# Patient Record
Sex: FEMALE | Race: WHITE | NOT HISPANIC OR LATINO | Employment: OTHER | URBAN - METROPOLITAN AREA
[De-identification: names, ages, dates, MRNs, and addresses within clinical notes are randomized per-mention and may not be internally consistent; named-entity substitution may affect disease eponyms.]

---

## 2020-02-24 ENCOUNTER — OFFICE VISIT (OUTPATIENT)
Dept: OBGYN CLINIC | Facility: CLINIC | Age: 30
End: 2020-02-24
Payer: COMMERCIAL

## 2020-02-24 ENCOUNTER — APPOINTMENT (OUTPATIENT)
Dept: LAB | Facility: CLINIC | Age: 30
End: 2020-02-24
Payer: COMMERCIAL

## 2020-02-24 VITALS
WEIGHT: 187.4 LBS | SYSTOLIC BLOOD PRESSURE: 108 MMHG | HEIGHT: 62 IN | DIASTOLIC BLOOD PRESSURE: 58 MMHG | BODY MASS INDEX: 34.48 KG/M2

## 2020-02-24 DIAGNOSIS — B96.89 BACTERIAL VAGINOSIS: ICD-10-CM

## 2020-02-24 DIAGNOSIS — E28.2 PCOS (POLYCYSTIC OVARIAN SYNDROME): ICD-10-CM

## 2020-02-24 DIAGNOSIS — N76.0 BACTERIAL VAGINOSIS: ICD-10-CM

## 2020-02-24 DIAGNOSIS — E28.2 PCOS (POLYCYSTIC OVARIAN SYNDROME): Primary | ICD-10-CM

## 2020-02-24 LAB
B-HCG SERPL-ACNC: <2 MIU/ML
BV WHIFF TEST VAG QL: NEGATIVE
CLUE CELLS SPEC QL WET PREP: POSITIVE
ESTRADIOL SERPL-MCNC: 89 PG/ML
FSH SERPL-ACNC: 3.9 MIU/ML
PH SMN: 5 [PH]
PROLACTIN SERPL-MCNC: 6 NG/ML
T VAGINALIS VAG QL WET PREP: NEGATIVE
YEAST VAG QL WET PREP: NEGATIVE

## 2020-02-24 PROCEDURE — 84403 ASSAY OF TOTAL TESTOSTERONE: CPT

## 2020-02-24 PROCEDURE — 84402 ASSAY OF FREE TESTOSTERONE: CPT

## 2020-02-24 PROCEDURE — 36415 COLL VENOUS BLD VENIPUNCTURE: CPT

## 2020-02-24 PROCEDURE — 99214 OFFICE O/P EST MOD 30 MIN: CPT | Performed by: OBSTETRICS & GYNECOLOGY

## 2020-02-24 PROCEDURE — 82627 DEHYDROEPIANDROSTERONE: CPT

## 2020-02-24 PROCEDURE — 84702 CHORIONIC GONADOTROPIN TEST: CPT

## 2020-02-24 PROCEDURE — 87210 SMEAR WET MOUNT SALINE/INK: CPT | Performed by: OBSTETRICS & GYNECOLOGY

## 2020-02-24 PROCEDURE — 83001 ASSAY OF GONADOTROPIN (FSH): CPT

## 2020-02-24 PROCEDURE — 84146 ASSAY OF PROLACTIN: CPT

## 2020-02-24 PROCEDURE — 82670 ASSAY OF TOTAL ESTRADIOL: CPT

## 2020-02-24 RX ORDER — METFORMIN HYDROCHLORIDE 500 MG/1
TABLET, EXTENDED RELEASE ORAL
COMMUNITY
Start: 2020-01-31 | End: 2021-03-29

## 2020-02-24 RX ORDER — FLUCONAZOLE 150 MG/1
150 TABLET ORAL
Qty: 3 TABLET | Refills: 0 | Status: SHIPPED | OUTPATIENT
Start: 2020-02-24 | End: 2020-03-02

## 2020-02-24 RX ORDER — LEVALBUTEROL TARTRATE 45 UG/1
2 AEROSOL, METERED ORAL EVERY 6 HOURS PRN
COMMUNITY
Start: 2020-01-17 | End: 2021-01-16

## 2020-02-24 RX ORDER — MELATONIN
2000 DAILY
COMMUNITY

## 2020-02-24 RX ORDER — METRONIDAZOLE 500 MG/1
500 TABLET ORAL EVERY 12 HOURS SCHEDULED
Qty: 14 TABLET | Refills: 0 | Status: SHIPPED | OUTPATIENT
Start: 2020-02-24 | End: 2020-03-02

## 2020-02-24 RX ORDER — CETIRIZINE HYDROCHLORIDE 1 MG/ML
10 SOLUTION ORAL
COMMUNITY

## 2020-02-24 NOTE — PATIENT INSTRUCTIONS
Bacterial Vaginosis   WHAT YOU NEED TO KNOW:   Bacterial vaginosis (BV) is an infection in the vagina  It may cause vaginitis, which is irritation and inflammation of the vagina  DISCHARGE INSTRUCTIONS:   Medicines:   · Antibiotics: These are given to kill the bacteria that cause BV  They may be given as a pill or a cream to put in your vagina  Take or use as directed  · Take your medicine as directed  Contact your healthcare provider if you think your medicine is not helping or if you have side effects  Tell him of her if you are allergic to any medicine  Keep a list of the medicines, vitamins, and herbs you take  Include the amounts, and when and why you take them  Bring the list or the pill bottles to follow-up visits  Carry your medicine list with you in case of an emergency  Prevent bacterial vaginosis:   · Keep your vaginal area clean and dry:  Wear underwear and pantyhose with a cotton crotch  Wipe from front to back after you urinate or have a bowel movement  After bathing, rinse soap from your vaginal area to decrease your risk for irritation  · Do not use products that cause irritation:  Always use unscented tampons or sanitary pads  Do not use feminine sprays, powders, or scented tampons because they may cause irritation and increase your risk of BV  Detergents and fabric softeners may also cause irritation  · Do not douche: This can cause an imbalance in healthy vaginal bacteria  · Use latex condoms: This helps prevent another infection and keeps your partner from getting the infection  Contact your healthcare provider if:   · Your symptoms come back or do not improve with treatment  · You have vaginal bleeding that is not your monthly period  · You have questions or concerns about your condition or care  © 2017 [employer] Information is for End User's use only and may not be sold, redistributed or otherwise used for commercial purposes   All illustrations and images included in CareNotes® are the copyrighted property of A D A M , Inc  or [employer]am Brown  The above information is an  only  It is not intended as medical advice for individual conditions or treatments  Talk to your doctor, nurse or pharmacist before following any medical regimen to see if it is safe and effective for you

## 2020-02-24 NOTE — PROGRESS NOTES
Assessment/Plan:     Diagnoses and all orders for this visit:    PCOS (polycystic ovarian syndrome)  -     hCG, quantitative; Future  -     Follicle stimulating hormone; Future  -     DHEA-sulfate; Future  -     Prolactin; Future  -     Testosterone, free, total; Future  -     Estradiol; Future    Bacterial vaginosis  -     fluconazole (DIFLUCAN) 150 mg tablet; Take 1 tablet (150 mg total) by mouth every 3 (three) days for 3 doses  -     metroNIDAZOLE (FLAGYL) 500 mg tablet; Take 1 tablet (500 mg total) by mouth every 12 (twelve) hours for 7 days  -     POCT wet mount    Other orders  -     cetirizine (ZyrTEC) oral solution; Take 10 mg by mouth  -     levalbuterol (XOPENEX HFA) 45 mcg/act inhaler; Inhale 2 puffs every 6 (six) hours as needed  -     metFORMIN (GLUCOPHAGE-XR) 500 mg 24 hr tablet; Start one tab after dinner  Add tab after breakfast  Add 2nd tab after dinner, 2nd tab after breakfast  Goal is two 500 mg tablets 2x/day  -     cholecalciferol (VITAMIN D3) 1,000 units tablet; Take 2,000 Units by mouth daily            Bacterial Vaginosis    Bacterial vaginosis diagnosed on exam today based on symptoms and clinical findings  Amsel criteria (three out of four) was used for diagnosis, vaginal pH >4 5, positive whiff test, clue cells on saline microscopy (>20% of epithelial cells) and whiff test   Discussed with patient diagnosis in detail including risk factors, signs and symptoms  Treatment was indicated for relief of symptoms and was prescribed  Discussed with patient vulvar hygiene and avoidance of intercourse during treatment  Antibiotics were prescribed and patient advised to complete course of antibiotics  She was asked to call if with untoward side effects and if no relief of symptoms after treatment  Follow-up is unnecessary if symptoms resolve  Discussed with patient that treatment of sexual partners is unnecessary    Also discussed that 30% of patients may experience a recurrence in symptoms within 3  months  Patient states that she frequently gets a yeast infection after being treated with bacterial vaginosis and so she was given a prescription for fluconazole  With regards to abnormal vaginal bleeding, this may be related to her polycystic ovarian syndrome  She declines hormonal therapy unless indicated  She would like to have her hormone levels checked including FSH due to her hot flashes  I also discussed that most likely her pain with intercourse was triggered by her bacterial vaginosis symptoms  She was asked to follow up in 1 week to go over test results and to discuss further treatment and evaluation recommendations  I did recommend that she start metformin to improve insulin resistance and also afford weight loss as well as improvement in ovulatory function  Subjective   Patient ID: Diane Juan is a 34 y o  female  Chief Complaint   Patient presents with    Dyspareunia    Polycystic Ovary Syndrome    Vaginal Discharge     pinkish color    Hot Flashes    Vaginal Itching     patient has h/o BV          HPI:  Patient is a 59-year-old  6 para 3024  Patient with a history of 3 prior C-sections, 1 spontaneous vaginal delivery,  2 SAB and a tubal ligation  Patient with history of polycystic ovarian syndrome  She was initially supposed to start metformin 500 mg b i d  more recently with suggested by weight management  She is currently not using hormonal contraception   She is sexually active with her   She has been  for the past 4 years and denied any STDs  She delivered in 2018 at [place] [address]    She complained of abnormal uterine bleeding with prolonged spotting the beginning of January  She had also complained with pain with intercourse  She does present with itching and vaginal odor  She does report hot flashes and decreased sex drive    She was seen at [facility] and a pelvic ultrasound was ordered that was normal   TSH was ordered and was also normal   She was told she had sexual dysfunction and is here for 2nd opinion      Menstrual History:  OB History        6    Para   4    Term   4       0    AB   2    Living   4       SAB   0    TAB        Ectopic        Multiple        Live Births   4                   Patient's last menstrual period was 2019 (exact date)           Past Medical History:   Diagnosis Date    PCOS (polycystic ovarian syndrome)     PVC (premature ventricular contraction)        Past Surgical History:   Procedure Laterality Date     SECTION  2013     SECTION  2017     SECTION  2018    CHOLECYSTECTOMY  2014       Social History     Socioeconomic History    Marital status: /Civil Union     Spouse name: Not on file    Number of children: Not on file    Years of education: Not on file    Highest education level: Not on file   Occupational History    Not on file   Social Needs    Financial resource strain: Not on file    Food insecurity:     Worry: Not on file     Inability: Not on file    Transportation needs:     Medical: Not on file     Non-medical: Not on file   Tobacco Use    Smoking status: Never Smoker    Smokeless tobacco: Never Used   Substance and Sexual Activity    Alcohol use: Not Currently     Frequency: Never    Drug use: Never    Sexual activity: Yes     Partners: Male   Lifestyle    Physical activity:     Days per week: Not on file     Minutes per session: Not on file    Stress: Not on file   Relationships    Social connections:     Talks on phone: Not on file     Gets together: Not on file     Attends Taoist service: Not on file     Active member of club or organization: Not on file     Attends meetings of clubs or organizations: Not on file     Relationship status: Not on file    Intimate partner violence:     Fear of current or ex partner: Not on file     Emotionally abused: Not on file Physically abused: Not on file     Forced sexual activity: Not on file   Other Topics Concern    Not on file   Social History Narrative    Not on file       Allergies   Allergen Reactions    Lac Bovis     Latex Rash    Penicillins Rash         Current Outpatient Medications:     cetirizine (ZyrTEC) oral solution, Take 10 mg by mouth, Disp: , Rfl:     cholecalciferol (VITAMIN D3) 1,000 units tablet, Take 2,000 Units by mouth daily, Disp: , Rfl:     levalbuterol (XOPENEX HFA) 45 mcg/act inhaler, Inhale 2 puffs every 6 (six) hours as needed, Disp: , Rfl:     metFORMIN (GLUCOPHAGE-XR) 500 mg 24 hr tablet, Start one tab after dinner  Add tab after breakfast  Add 2nd tab after dinner, 2nd tab after breakfast  Goal is two 500 mg tablets 2x/day , Disp: , Rfl:     fluconazole (DIFLUCAN) 150 mg tablet, Take 1 tablet (150 mg total) by mouth every 3 (three) days for 3 doses, Disp: 3 tablet, Rfl: 0    metroNIDAZOLE (FLAGYL) 500 mg tablet, Take 1 tablet (500 mg total) by mouth every 12 (twelve) hours for 7 days, Disp: 14 tablet, Rfl: 0    Review of Systems   Constitutional: Negative  HENT: Negative  Eyes: Negative  Respiratory: Negative  Cardiovascular: Negative  Gastrointestinal: Negative  Endocrine: Negative  Genitourinary:        As noted in HPI   Musculoskeletal: Negative  Skin: Negative  Allergic/Immunologic: Negative  Neurological: Negative  Hematological: Negative  Psychiatric/Behavioral: Negative  /58 (BP Location: Right arm, Patient Position: Sitting, Cuff Size: Standard)   Ht 5' 2" (1 575 m)   Wt 85 kg (187 lb 6 4 oz)   LMP 12/31/2019 (Exact Date)   BMI 34 28 kg/m²     Physical Exam   Constitutional: She is oriented to person, place, and time  She appears well-developed and well-nourished  No distress  Genitourinary: Uterus normal  Pelvic exam was performed with patient supine  There is no rash, tenderness, lesion or injury on the right labia   There is no rash, tenderness, lesion or injury on the left labia  No erythema, tenderness or bleeding in the vagina  No signs of injury around the vagina  Vaginal discharge found  Right adnexum does not display mass, does not display tenderness and does not display fullness  Left adnexum does not display mass, does not display tenderness and does not display fullness  Cervix does not exhibit motion tenderness, lesion, discharge or polyp  Uterus is not enlarged or tender  Abdominal: Soft  She exhibits no distension  There is no tenderness  Neurological: She is alert and oriented to person, place, and time  Skin: Skin is warm and dry  Psychiatric: She has a normal mood and affect  Her behavior is normal            The following portions of the patient's history were reviewed and updated as appropriate: allergies, current medications, past family history, past medical history, past social history, past surgical history and problem list       Counseling / Coordination of Care  Total time spent today30 minutes  minutes  Greater than 50% of total time was spent with the patient and / or family counselin             [facility] [address]                                [address]                            [facility] [address]                          Voice:  [phone]                           Fax:  [phone]      2020      RE: Saturnino Andrade                 Physician: SYLWIA Lizama   MR#: [id number]                     [facility]  : [facility] [address]    (Exam #: [id number])              [place], [place] [address]                                     Fax: [phone]    The LMP of this 34year old patient was unknown  A sonographic  examination was performed on 2020 using real time equipment  The ultrasound examination was performed using vaginal technique      The indication for this exam was pelvic pain  INDICATIONS    pelvic pain    CERVICAL EVALUATION    The cervix appeared normal     UTERUS    The uterus was visualized, anteverted in orientation with a  symmetrical shape  The size appeared normal, measuring 9 20 x 6 06 x  4 51 cm  The myometrium appeared homogeneous  The endometrium was  symmetrical in shape with a single layer thickness of 0 93 cm  The  endometrium lining appeared regular  CUL DE SAC FLUID    No free fluid was identified in the cul de sac  ADNEXA    The left ovary appeared normal and measured 2 1 x 2 1 x 2 9 cm with a  volume of 6 6 cc  The right ovary appeared normal and measured 2 2 x  2 0 x 2 8 cm with a volume of 6 4 cc  Neither of the tubes was  visualized  GENERAL COMMENT    Bilateral ovarian blood flow seen  Please see attached note for interpretation  DADA Ruiz , R HERIBERTO M S  Electronically Signed 02/18/20 10:46g and / or coordination of care       TSH normal

## 2020-02-25 LAB
DHEA-S SERPL-MCNC: 258.7 UG/DL (ref 84.8–378)
TESTOST FREE SERPL-MCNC: 2.8 PG/ML (ref 0–4.2)
TESTOST SERPL-MCNC: 42 NG/DL (ref 8–48)

## 2020-04-06 ENCOUNTER — TELEMEDICINE (OUTPATIENT)
Dept: OBGYN CLINIC | Facility: CLINIC | Age: 30
End: 2020-04-06
Payer: COMMERCIAL

## 2020-04-06 VITALS — SYSTOLIC BLOOD PRESSURE: 128 MMHG | WEIGHT: 181.4 LBS | BODY MASS INDEX: 33.18 KG/M2 | DIASTOLIC BLOOD PRESSURE: 80 MMHG

## 2020-04-06 DIAGNOSIS — R39.9 UTI SYMPTOMS: Primary | ICD-10-CM

## 2020-04-06 DIAGNOSIS — R30.0 DYSURIA: ICD-10-CM

## 2020-04-06 DIAGNOSIS — R35.0 FREQUENCY OF URINATION: ICD-10-CM

## 2020-04-06 PROCEDURE — 99213 OFFICE O/P EST LOW 20 MIN: CPT | Performed by: OBSTETRICS & GYNECOLOGY

## 2020-04-06 RX ORDER — NITROFURANTOIN 25; 75 MG/1; MG/1
100 CAPSULE ORAL 2 TIMES DAILY
Qty: 14 CAPSULE | Refills: 0 | Status: SHIPPED | OUTPATIENT
Start: 2020-04-06

## 2020-04-06 RX ORDER — OMEGA-3/DHA/EPA/FISH OIL 60 MG-90MG
CAPSULE ORAL
COMMUNITY
End: 2021-03-29

## 2020-04-06 RX ORDER — FLUOXETINE 10 MG/1
10 CAPSULE ORAL DAILY
COMMUNITY
Start: 2020-03-02 | End: 2021-03-29

## 2020-04-06 RX ORDER — PHENAZOPYRIDINE HYDROCHLORIDE 200 MG/1
200 TABLET, FILM COATED ORAL
Qty: 10 TABLET | Refills: 0 | Status: SHIPPED | OUTPATIENT
Start: 2020-04-06 | End: 2020-04-11

## 2021-03-29 RX ORDER — ACETAMINOPHEN 500 MG
500 TABLET ORAL EVERY 6 HOURS PRN
COMMUNITY

## 2021-03-29 RX ORDER — ALBUTEROL SULFATE 90 UG/1
2 AEROSOL, METERED RESPIRATORY (INHALATION) EVERY 6 HOURS PRN
COMMUNITY

## 2021-03-29 RX ORDER — MULTIVITAMIN
1 TABLET ORAL DAILY
COMMUNITY

## 2021-03-29 NOTE — PRE-PROCEDURE INSTRUCTIONS
Pre-Surgery Instructions:   Medication Instructions    acetaminophen (TYLENOL) 500 mg tablet Instructed patient per Anesthesia Guidelines   albuterol (PROVENTIL HFA,VENTOLIN HFA) 90 mcg/act inhaler Instructed patient per Anesthesia Guidelines   cetirizine (ZyrTEC) oral solution Instructed patient per Anesthesia Guidelines   cholecalciferol (VITAMIN D3) 1,000 units tablet Instructed patient per Anesthesia Guidelines   Multiple Vitamin (multivitamin) tablet Instructed patient per Anesthesia Guidelines  Patient instructed if needed tylenolwith a sip of water the morning of surgery and if needed albuterol IN  Patient  instructed on use of chlorhexidine soap per hospital protocol    Patient instructed to stop all ASA, NSAIDS, vitamins and herbal supplements one week prior to surgery or per Dr Mehreen Flores

## 2021-03-30 ENCOUNTER — ANESTHESIA EVENT (OUTPATIENT)
Dept: PERIOP | Facility: HOSPITAL | Age: 31
End: 2021-03-30
Payer: SELF-PAY

## 2021-03-31 ENCOUNTER — HOSPITAL ENCOUNTER (OUTPATIENT)
Facility: HOSPITAL | Age: 31
Setting detail: OUTPATIENT SURGERY
Discharge: HOME | End: 2021-03-31
Attending: SURGERY | Admitting: SURGERY
Payer: SELF-PAY

## 2021-03-31 ENCOUNTER — ANESTHESIA (OUTPATIENT)
Dept: PERIOP | Facility: HOSPITAL | Age: 31
End: 2021-03-31
Payer: SELF-PAY

## 2021-03-31 VITALS
HEIGHT: 62 IN | HEART RATE: 70 BPM | RESPIRATION RATE: 16 BRPM | DIASTOLIC BLOOD PRESSURE: 51 MMHG | BODY MASS INDEX: 29.44 KG/M2 | SYSTOLIC BLOOD PRESSURE: 107 MMHG | WEIGHT: 160 LBS | TEMPERATURE: 97.5 F | OXYGEN SATURATION: 98 %

## 2021-03-31 PROBLEM — F41.9 ANXIETY: Status: ACTIVE | Noted: 2021-03-31

## 2021-03-31 PROBLEM — E88.1 LIPODYSTROPHY: Status: ACTIVE | Noted: 2021-03-31

## 2021-03-31 LAB
EXT PREGNANCY TEST URINE: NEGATIVE
EXT. CONTROL: NORMAL

## 2021-03-31 PROCEDURE — 81025 URINE PREGNANCY TEST: CPT | Performed by: ANESTHESIOLOGY

## 2021-03-31 RX ORDER — DEXAMETHASONE SODIUM PHOSPHATE 10 MG/ML
INJECTION, SOLUTION INTRAMUSCULAR; INTRAVENOUS AS NEEDED
Status: DISCONTINUED | OUTPATIENT
Start: 2021-03-31 | End: 2021-03-31

## 2021-03-31 RX ORDER — KETOROLAC TROMETHAMINE 30 MG/ML
30 INJECTION, SOLUTION INTRAMUSCULAR; INTRAVENOUS ONCE
Status: COMPLETED | OUTPATIENT
Start: 2021-03-31 | End: 2021-03-31

## 2021-03-31 RX ORDER — MINERAL OIL
OIL (ML) MISCELLANEOUS AS NEEDED
Status: DISCONTINUED | OUTPATIENT
Start: 2021-03-31 | End: 2021-03-31 | Stop reason: HOSPADM

## 2021-03-31 RX ORDER — ROCURONIUM BROMIDE 10 MG/ML
INJECTION, SOLUTION INTRAVENOUS AS NEEDED
Status: DISCONTINUED | OUTPATIENT
Start: 2021-03-31 | End: 2021-03-31

## 2021-03-31 RX ORDER — CEFAZOLIN SODIUM 1 G/50ML
1000 SOLUTION INTRAVENOUS ONCE
Status: COMPLETED | OUTPATIENT
Start: 2021-03-31 | End: 2021-03-31

## 2021-03-31 RX ORDER — SODIUM CHLORIDE, SODIUM LACTATE, POTASSIUM CHLORIDE, CALCIUM CHLORIDE 600; 310; 30; 20 MG/100ML; MG/100ML; MG/100ML; MG/100ML
INJECTION, SOLUTION INTRAVENOUS CONTINUOUS PRN
Status: DISCONTINUED | OUTPATIENT
Start: 2021-03-31 | End: 2021-03-31

## 2021-03-31 RX ORDER — HYDROCODONE BITARTRATE AND ACETAMINOPHEN 5; 325 MG/1; MG/1
1 TABLET ORAL EVERY 4 HOURS PRN
Status: DISCONTINUED | OUTPATIENT
Start: 2021-03-31 | End: 2021-03-31 | Stop reason: HOSPADM

## 2021-03-31 RX ORDER — FENTANYL CITRATE/PF 50 MCG/ML
25 SYRINGE (ML) INJECTION
Status: DISCONTINUED | OUTPATIENT
Start: 2021-03-31 | End: 2021-03-31 | Stop reason: HOSPADM

## 2021-03-31 RX ORDER — LIDOCAINE HYDROCHLORIDE 10 MG/ML
INJECTION, SOLUTION EPIDURAL; INFILTRATION; INTRACAUDAL; PERINEURAL AS NEEDED
Status: DISCONTINUED | OUTPATIENT
Start: 2021-03-31 | End: 2021-03-31

## 2021-03-31 RX ORDER — HYDROMORPHONE HCL/PF 1 MG/ML
0.5 SYRINGE (ML) INJECTION
Status: DISCONTINUED | OUTPATIENT
Start: 2021-03-31 | End: 2021-03-31 | Stop reason: HOSPADM

## 2021-03-31 RX ORDER — EPHEDRINE SULFATE 50 MG/ML
INJECTION INTRAVENOUS AS NEEDED
Status: DISCONTINUED | OUTPATIENT
Start: 2021-03-31 | End: 2021-03-31

## 2021-03-31 RX ORDER — FENTANYL CITRATE 50 UG/ML
INJECTION, SOLUTION INTRAMUSCULAR; INTRAVENOUS AS NEEDED
Status: DISCONTINUED | OUTPATIENT
Start: 2021-03-31 | End: 2021-03-31

## 2021-03-31 RX ORDER — PROPOFOL 10 MG/ML
INJECTION, EMULSION INTRAVENOUS AS NEEDED
Status: DISCONTINUED | OUTPATIENT
Start: 2021-03-31 | End: 2021-03-31

## 2021-03-31 RX ORDER — SODIUM CHLORIDE 9 MG/ML
125 INJECTION, SOLUTION INTRAVENOUS CONTINUOUS
Status: DISCONTINUED | OUTPATIENT
Start: 2021-03-31 | End: 2021-03-31 | Stop reason: HOSPADM

## 2021-03-31 RX ORDER — MIDAZOLAM HYDROCHLORIDE 2 MG/2ML
INJECTION, SOLUTION INTRAMUSCULAR; INTRAVENOUS AS NEEDED
Status: DISCONTINUED | OUTPATIENT
Start: 2021-03-31 | End: 2021-03-31

## 2021-03-31 RX ORDER — BUPIVACAINE HYDROCHLORIDE AND EPINEPHRINE 2.5; 5 MG/ML; UG/ML
INJECTION, SOLUTION EPIDURAL; INFILTRATION; INTRACAUDAL; PERINEURAL AS NEEDED
Status: DISCONTINUED | OUTPATIENT
Start: 2021-03-31 | End: 2021-03-31 | Stop reason: HOSPADM

## 2021-03-31 RX ORDER — HYDROCODONE BITARTRATE AND ACETAMINOPHEN 5; 325 MG/1; MG/1
2 TABLET ORAL EVERY 4 HOURS PRN
Status: DISCONTINUED | OUTPATIENT
Start: 2021-03-31 | End: 2021-03-31 | Stop reason: HOSPADM

## 2021-03-31 RX ORDER — GLYCOPYRROLATE 0.2 MG/ML
INJECTION INTRAMUSCULAR; INTRAVENOUS AS NEEDED
Status: DISCONTINUED | OUTPATIENT
Start: 2021-03-31 | End: 2021-03-31

## 2021-03-31 RX ORDER — NEOSTIGMINE METHYLSULFATE 1 MG/ML
INJECTION INTRAVENOUS AS NEEDED
Status: DISCONTINUED | OUTPATIENT
Start: 2021-03-31 | End: 2021-03-31

## 2021-03-31 RX ORDER — ONDANSETRON 2 MG/ML
INJECTION INTRAMUSCULAR; INTRAVENOUS AS NEEDED
Status: DISCONTINUED | OUTPATIENT
Start: 2021-03-31 | End: 2021-03-31

## 2021-03-31 RX ORDER — ALBUTEROL SULFATE 2.5 MG/3ML
2.5 SOLUTION RESPIRATORY (INHALATION) ONCE AS NEEDED
Status: DISCONTINUED | OUTPATIENT
Start: 2021-03-31 | End: 2021-03-31 | Stop reason: HOSPADM

## 2021-03-31 RX ORDER — DEXMEDETOMIDINE HYDROCHLORIDE 100 UG/ML
INJECTION, SOLUTION INTRAVENOUS AS NEEDED
Status: DISCONTINUED | OUTPATIENT
Start: 2021-03-31 | End: 2021-03-31

## 2021-03-31 RX ADMIN — DEXMEDETOMIDINE HYDROCHLORIDE 12 MCG: 100 INJECTION, SOLUTION INTRAVENOUS at 08:55

## 2021-03-31 RX ADMIN — FENTANYL CITRATE 50 MCG: 50 INJECTION, SOLUTION INTRAMUSCULAR; INTRAVENOUS at 10:06

## 2021-03-31 RX ADMIN — DEXMEDETOMIDINE HYDROCHLORIDE 8 MCG: 100 INJECTION, SOLUTION INTRAVENOUS at 09:05

## 2021-03-31 RX ADMIN — ROCURONIUM BROMIDE 20 MG: 10 INJECTION, SOLUTION INTRAVENOUS at 09:43

## 2021-03-31 RX ADMIN — PROPOFOL 200 MG: 10 INJECTION, EMULSION INTRAVENOUS at 08:47

## 2021-03-31 RX ADMIN — SODIUM CHLORIDE, SODIUM LACTATE, POTASSIUM CHLORIDE, AND CALCIUM CHLORIDE: .6; .31; .03; .02 INJECTION, SOLUTION INTRAVENOUS at 10:06

## 2021-03-31 RX ADMIN — ONDANSETRON 4 MG: 2 INJECTION INTRAMUSCULAR; INTRAVENOUS at 09:08

## 2021-03-31 RX ADMIN — DEXAMETHASONE SODIUM PHOSPHATE 4 MG: 10 INJECTION, SOLUTION INTRAMUSCULAR; INTRAVENOUS at 09:08

## 2021-03-31 RX ADMIN — SODIUM CHLORIDE: 0.9 INJECTION, SOLUTION INTRAVENOUS at 09:20

## 2021-03-31 RX ADMIN — GLYCOPYRROLATE 0.6 MG: 0.2 INJECTION, SOLUTION INTRAMUSCULAR; INTRAVENOUS at 11:59

## 2021-03-31 RX ADMIN — EPHEDRINE SULFATE 5 MG: 50 INJECTION, SOLUTION INTRAVENOUS at 11:33

## 2021-03-31 RX ADMIN — NEOSTIGMINE METHYLSULFATE 4 MG: 1 INJECTION INTRAVENOUS at 11:59

## 2021-03-31 RX ADMIN — SODIUM CHLORIDE, SODIUM LACTATE, POTASSIUM CHLORIDE, AND CALCIUM CHLORIDE: .6; .31; .03; .02 INJECTION, SOLUTION INTRAVENOUS at 11:35

## 2021-03-31 RX ADMIN — CEFAZOLIN SODIUM 1000 MG: 1 SOLUTION INTRAVENOUS at 08:35

## 2021-03-31 RX ADMIN — ONDANSETRON 4 MG: 2 INJECTION INTRAMUSCULAR; INTRAVENOUS at 11:51

## 2021-03-31 RX ADMIN — ROCURONIUM BROMIDE 50 MG: 10 INJECTION, SOLUTION INTRAVENOUS at 08:47

## 2021-03-31 RX ADMIN — MIDAZOLAM 2 MG: 1 INJECTION INTRAMUSCULAR; INTRAVENOUS at 08:39

## 2021-03-31 RX ADMIN — FENTANYL CITRATE 50 MCG: 50 INJECTION, SOLUTION INTRAMUSCULAR; INTRAVENOUS at 09:57

## 2021-03-31 RX ADMIN — KETOROLAC TROMETHAMINE 30 MG: 30 INJECTION, SOLUTION INTRAMUSCULAR at 13:49

## 2021-03-31 RX ADMIN — SODIUM CHLORIDE 125 ML/HR: 0.9 INJECTION, SOLUTION INTRAVENOUS at 08:04

## 2021-03-31 RX ADMIN — HYDROMORPHONE HYDROCHLORIDE 0.5 MG: 1 INJECTION, SOLUTION INTRAMUSCULAR; INTRAVENOUS; SUBCUTANEOUS at 13:01

## 2021-03-31 RX ADMIN — EPHEDRINE SULFATE 10 MG: 50 INJECTION, SOLUTION INTRAVENOUS at 09:47

## 2021-03-31 RX ADMIN — ROCURONIUM BROMIDE 20 MG: 10 INJECTION, SOLUTION INTRAVENOUS at 10:45

## 2021-03-31 RX ADMIN — FENTANYL CITRATE 25 MCG: 50 INJECTION, SOLUTION INTRAMUSCULAR; INTRAVENOUS at 12:41

## 2021-03-31 RX ADMIN — FENTANYL CITRATE 25 MCG: 50 INJECTION, SOLUTION INTRAMUSCULAR; INTRAVENOUS at 12:25

## 2021-03-31 RX ADMIN — GLYCOPYRROLATE 0.2 MG: 0.2 INJECTION, SOLUTION INTRAMUSCULAR; INTRAVENOUS at 08:36

## 2021-03-31 RX ADMIN — DEXMEDETOMIDINE HYDROCHLORIDE 8 MCG: 100 INJECTION, SOLUTION INTRAVENOUS at 09:36

## 2021-03-31 RX ADMIN — DEXMEDETOMIDINE HYDROCHLORIDE 12 MCG: 100 INJECTION, SOLUTION INTRAVENOUS at 09:10

## 2021-03-31 RX ADMIN — LIDOCAINE HYDROCHLORIDE 80 MG: 10 INJECTION, SOLUTION EPIDURAL; INFILTRATION; INTRACAUDAL; PERINEURAL at 08:47

## 2021-03-31 RX ADMIN — FENTANYL CITRATE 100 MCG: 50 INJECTION, SOLUTION INTRAMUSCULAR; INTRAVENOUS at 08:44

## 2021-03-31 NOTE — ANESTHESIA PREPROCEDURE EVALUATION
Procedure:  ABDOMINOPLASTY (N/A Abdomen)    Relevant Problems   NEURO/PSYCH   (+) Anxiety        Physical Exam    Airway    Mallampati score: II  TM Distance: >3 FB  Neck ROM: full     Dental   No notable dental hx     Cardiovascular  Rhythm: regular, Rate: normal, Cardiovascular exam normal    Pulmonary  Pulmonary exam normal Breath sounds clear to auscultation,     Other Findings        Anesthesia Plan  ASA Score- 1     Anesthesia Type- general with ASA Monitors  Additional Monitors:   Airway Plan: ETT  Plan Factors-Exercise tolerance (METS): >4 METS  Chart reviewed  Existing labs reviewed  Patient is not a current smoker  Obstructive sleep apnea risk education given perioperatively  Induction- intravenous  Postoperative Plan-     Informed Consent- Anesthetic plan and risks discussed with patient

## 2021-03-31 NOTE — DISCHARGE INSTRUCTIONS
[address] [facility], [address] [place] [address], [address] [place] [address], [facility], [address] [place] [address] /X / [facility] Instructions for the following procedures:   Abdominoplasty, Bodylift, Panniculectomy, Thigh lift       Please call the office today to schedule a post operative visit, and tell the office staff that your doctor needs to see you in our office in 7-10 days  1  Use your pain medication as instructed  Pain medication may cause constipation  If this happens you can take something over the counter such as Senakot, or colace to help with that  You want to make sure that you take the pain medication with food, to avoid getting an upset stomach  Do not drink alcohol while taking the pain medications  2  Call your doctor if you notice any excessive swelling or bleeding  3  Exercise calf muscles every hour  Extend your foot up and own at the ankle several times  Getting our of bed after surgery and walking is important to your health  4  Elevate both the head and foot of the bed with two to three pillows  This will relieve tension on the suture line  Many patients choose to recover in a recliner for the first few days to help with putting strain on the abdominal area  5  You may feel the need to walk in the bent over position  You may stand up straight  It is not uncommon to develop low back pain when you walk in the bent-over position  This discomfort should subside once you are standing upright again  6  Drink plenty of liquids  7  Do not smoke  8  Do not lift anything that weights more then 10 pounds for about 4 weeks after the day of your surgery unless instructed by your doctor  9  You may remove dressings and shower the next day unless otherwise instructed by your doctor  Continue wearing your binder and/or garment at all times, except when showering or washing the garment   If you are wearing a binder, sometimes patients are more comfortable wearing an old T-shirt underneath the binder  10  If you have drains, leaking around the drain site may occur  This is normal  you may apply a gauze sponge around the area to help with the drainage  The hospital staff will show you how to take care of the drains  Also, you will need to keep a record of the amount of drainage from each drain  11  It is not uncommon to have some serous fluid (yellowish, straw colored) drainage from your incisions  Do not hesitate to call the office at [phone] if you have any questions about your surgery  The nursing staff will be glad to assist you in any possible way  If it is necessary for you to contract a doctor when the office is closed or on the weekend, please call [phone] and it will direct you to the answering service  A physician will contact you to assist you with any problems or questions  Prince-Hill Drain Care   WHAT YOU NEED TO KNOW:   A [place]-Hill (THUY) drain is used to remove fluids that build up in an area of your body after surgery  The THUY drain is a bulb shaped device connected to a tube  One end of the tube is placed inside you during surgery  The other end comes out through a small cut in your skin  The bulb is connected to this end  You may have a stitch to hold the tube in place  DISCHARGE INSTRUCTIONS:   Seek care immediately if:   · Your THUY drain breaks or comes out  · You have cloudy yellow or brown drainage from your THUY drain site, or the drainage smells bad  Contact your healthcare provider if:   · You drain less than 30 milliliters (2 tablespoons) in 24 hours  This may mean your drain can be removed  · You suddenly stop draining fluid or think your THUY drain is blocked  · You have a fever higher than 101 5°F (38 6°C)  · You have increased pain, redness, or swelling around the drain site  · You have questions about your THUY drain care      How a Prince-Hill drain works: The THUY drain removes fluids by creating suction in the tube  The bulb is squeezed flat and connected to the tube that sticks out of your body  The bulb expands as it fills with fluid  How to change the bandage around your Prince-Hill drain:  If you have a bandage, change it once a day  You may need to change your bandage more than once a day if it gets completely wet  · Wash your hands with soap and water  · Loosen the tape and gently remove the old bandage  Throw the old bandage into a plastic trash bag  · Use soap and water or saline (salt water) solution to clean your THUY drain site  Dip a cotton swab or gauze pad in the solution and gently clean your skin  · Pat the area dry  · Place a new bandage on your THUY drain site and secure it to your skin with medical tape  · Wash your hands  How to empty the Prince-Hill drain:  Empty the bulb when it is half full or every 8 to 12 hours  · Wash your hands with soap and water  · Remove the plug from the bulb  · Pour the fluid into a measuring cup  · Clean the plug with an alcohol swab or a cotton ball dipped in rubbing alcohol  · Squeeze the bulb flat and put the plug back in  The bulb should stay flat until it starts to fill with fluid again  · Measure the amount of fluid you pour out  Write down how much fluid you empty from the THUY drain and the date and time you collected it  · Flush the fluid down the toilet  Wash your hands  Clear clogged tubing: Use the following steps to clear your Prince-Hill tubing:  · Hold the tubing between your thumb and first finger at the place closest to your skin  This hand will prevent the tube from being pulled out of your skin  · Use your other thumb and first finger to slide the clog down the tubing toward the bulb  You may have to repeat the sliding until the tubing is unclogged      Prince-Hill drain removal:  The amount of fluid that you drain will decrease as your wound heals  The THUY drain usually is removed when less than 30 milliliters (2 tablespoons) is collected in 24 hours  Ask your healthcare provider when and how your THUY drain will be removed  Follow up with your healthcare provider as directed:  Write down your questions so you remember to ask them during your visits  © Copyright [address] Information is for End User's use only and may not be sold, redistributed or otherwise used for commercial purposes  All illustrations and images included in CareNotes® are the copyrighted property of A D A M , Inc  or [facility] Milton Munoz   The above information is an  only  It is not intended as medical advice for individual conditions or treatments  Talk to your doctor, nurse or pharmacist before following any medical regimen to see if it is safe and effective for you

## 2021-03-31 NOTE — DISCHARGE SUMMARY
PLASTIC, RECONSTRUCTIVE, & HAND SURGERY   Discharge Summary  Date of Admission:   3/31/2021  Date of Discharge:   03/31/21  Attending:  Lashawn Mancilla MD  Principal/Final Diagnosis:   Localized adiposity [E65]  Principal Procedure:   ABDOMINOPLASTY (N/A Abdomen)  Discharge Medications:  See after visit summary for reconciled discharge medications provided to patient and family  Reason for Admission:  Kobe Alamo was electively admitted to undergo the above named procedure on 3/31/2021 as an outpatient  Hospital Course:  Patient underwent the above named procedure on the day of admission without complications  They were discharged home the same day  Disposition:  To home in care of self and family    Condition:  Good  Follow up:  Patient with follow up in the office with Dr Lashawn Mancilla MD in 1 week(s) or as scheduled per his office  Lashawn Mancilla MD  3/31/2021 8:32 AM

## 2021-03-31 NOTE — INTERVAL H&P NOTE
H&P reviewed  After examining the patient I find no changes in the patients condition since the H&P had been written      Vitals:    03/31/21 0746   BP: 113/67   Pulse: 87   Resp: 20   Temp: 98 6 °F (37 °C)   SpO2: 97%

## 2021-03-31 NOTE — OP NOTE
OPERATIVE REPORT  PATIENT NAME: Mode Bourne    :  1990  MRN: [id number]  Pt Location: [facility] OR ROOM 05    SURGERY DATE: 3/31/2021    Surgeon(s) and Role:     * Jakob Lagos MD - Primary     * Nancie Ross - Assisting    Preop Diagnosis:  Localized adiposity [E65]    Post-Op Diagnosis Codes:     * Localized adiposity [E65]    Procedure(s) (LRB):  ABDOMINOPLASTY (N/A)    Specimen(s):  * No specimens in log *    Estimated Blood Loss:   300 mL    Drains:  Closed/Suction Drain Right;Lateral;Inferior Abdomen Other (Comment) 15 Fr  (Active)   Number of days: 0       Closed/Suction Drain Left;Lateral;Inferior Abdomen Other (Comment) 15 Fr  (Active)   Number of days: 0       [REMOVED] Urethral Catheter Double-lumen;Non-latex 16 Fr  (Removed)   Number of days: 0       Anesthesia Type:   General    Operative Indications:  Localized adiposity [E65]       Operative Findings:  none    Complications:   None    Procedure and Technique:  The patient was properly identified in the operating room, initially    intubated by anesthesia and placed in the prone position with adequate    padding support       We went ahead and tumesced the flank area for 1 liter of our tumescent    solution and suctioned the flank for 1 liter of fat  The stab    incisions were closed with 4-0 PDS and a Dermabond dressing     We then flipped the patient back into the supine position and    reprepped and redraped in the abdominal area  We made an incision in the suprapubic line hip to hip and elevated the skin off of the muscle layer dissecting all the way up to the xiphoid process  We then went ahead and circumscribed the umbilicus, but left it attached to the abdominal wall  We plicated the midline of the abdomen with both interrupted and running 0 Prolene suture  We injected 40 mL of 0 25% Marcaine with epinephrine into the abdominal    fascia  We pulled the excess skin inferiorly toward the feet   The    excess skin was then removed with a #15 blade and electrocautery  We    placed a 15-Kinyarwanda Keith drain into the abdominal defect and pulled it    out through a separate stab incision in the pubic line  The drain was    held in place with 3-0 nylon suture  We then went ahead and closed our    deep fascia with 2-0 Vicryl, 2-0 PDS, deep dermis of 2-0 Vicryl, 3-0    PDS, a running subcuticular 3-0 Monocryl stitch and a Dermabond    dressing  A new opening was made through the abdominal wall skin where    the umbilicus was pulled through  The umbilicus was sutured in place    with 3-0 PDS and 4-0 chromic sutures  The patient was then Dermabonded, placed into an abdominal binder, awakened from surgery and taken to the recovery room in stable condition       All counts were correct at the end of the procedure        I was present for the entire procedure    Patient Disposition:  PACU      SIGNATURE: Leora Peña MD  DATE: March 31, 2021  TIME: 12:08 PM

## 2023-02-13 ENCOUNTER — CONSULT (OUTPATIENT)
Dept: SURGERY | Facility: CLINIC | Age: 33
End: 2023-02-13

## 2023-02-13 VITALS
WEIGHT: 167 LBS | BODY MASS INDEX: 30.73 KG/M2 | HEART RATE: 74 BPM | DIASTOLIC BLOOD PRESSURE: 64 MMHG | HEIGHT: 62 IN | OXYGEN SATURATION: 97 % | SYSTOLIC BLOOD PRESSURE: 98 MMHG | RESPIRATION RATE: 18 BRPM | TEMPERATURE: 98.3 F

## 2023-02-13 DIAGNOSIS — R10.9 ABDOMINAL PAIN: Primary | ICD-10-CM

## 2023-02-13 DIAGNOSIS — Z98.890 H/O ABDOMINAL SURGERY: ICD-10-CM

## 2023-02-13 DIAGNOSIS — L98.9 SKIN LESIONS: ICD-10-CM

## 2023-02-13 RX ORDER — HYDROXYCHLOROQUINE SULFATE 200 MG/1
200 TABLET, FILM COATED ORAL 2 TIMES DAILY
COMMUNITY
Start: 2022-08-31 | End: 2023-08-31

## 2023-02-13 RX ORDER — DROSPIRENONE AND ETHINYL ESTRADIOL 0.02-3(28)
KIT ORAL
COMMUNITY
Start: 2023-02-04

## 2023-02-13 NOTE — H&P (VIEW-ONLY)
Assessment/Plan: Patient has 2 areas of skin thickening in the back which is getting bigger  This is related to her incisions for liposuction  This is most likely hypertrophic scar but I am not definitely sure  As this is giving her problems I am scheduling her for excision under local anesthesia/sedation  She also has infraumbilical abdominal pain  This has been going on since her abdominoplasty  I could not palpate any hernia defect however sometimes it could be difficult to palpate because of fascial plication  I am sending her for a CT of the abdomen to rule out any hernias  Her skin lesion surgery has been planned for March 9  No problem-specific Assessment & Plan notes found for this encounter  Diagnoses and all orders for this visit:    Abdominal pain  -     CT abdomen pelvis with and without contrast; Future    H/O abdominal surgery  -     CT abdomen pelvis with and without contrast; Future    Skin lesions    Other orders  -     drospirenone-ethinyl estradiol (MANUEL) 3-0 02 MG per tablet; take 1 tablet by mouth daily START DAY 1 OF MENSTRUAL CYCLE OF FIRST SUNDAY AFTER ONSET OF MENSES  -     hydroxychloroquine (PLAQUENIL) 200 mg tablet; Take 200 mg by mouth 2 (two) times a day          Subjective:      Patient ID: Rick Barnhart is a 28 y o  female  12-year-old female patient came to my office regarding 2 problems  First problem was the appearance of 2 skin lesions over her back about a month ago  She says that there is no drainage  The skin lesions have become slightly bigger  They are mildly erythematous  Occasional pain occurs in those lesions  The second problem she is having is that following her abdominoplasty 2 years ago she is now starting to have some periumbilical pain  She says that the pain occurs more when she is lifting or straining  No obvious visible swelling  No nausea or vomiting        The following portions of the patient's history were reviewed and updated as appropriate: allergies, current medications, past family history, past medical history, past social history, past surgical history and problem list     Review of Systems   Constitutional: Negative  HENT: Negative  Eyes: Negative  Respiratory: Negative  Cardiovascular: Negative  Gastrointestinal: Negative  Endocrine: Negative  Genitourinary: Negative  Musculoskeletal: Negative  Allergic/Immunologic: Negative  Neurological: Negative  Hematological: Negative  Psychiatric/Behavioral: Negative  Objective:      BP 98/64 (BP Location: Left arm, Patient Position: Sitting, Cuff Size: Standard)   Pulse 74   Temp 98 3 °F (36 8 °C) (Tympanic)   Resp 18   Ht 5' 1 5" (1 562 m)   Wt 75 8 kg (167 lb)   SpO2 97%   BMI 31 04 kg/m²          Physical Exam  Vitals reviewed  Constitutional:       Appearance: Normal appearance  HENT:      Head: Normocephalic  Mouth/Throat:      Mouth: Mucous membranes are moist    Eyes:      Pupils: Pupils are equal, round, and reactive to light  Cardiovascular:      Rate and Rhythm: Normal rate and regular rhythm  Pulmonary:      Effort: Pulmonary effort is normal       Breath sounds: Normal breath sounds  Abdominal:      General: Bowel sounds are normal       Palpations: Abdomen is soft  Tenderness: There is abdominal tenderness (Minimum tenderness infraumbilical )  Comments: No cough impulse  No palpable defects  Musculoskeletal:         General: Normal range of motion  Cervical back: Normal range of motion  Back:    Skin:     General: Skin is warm  Neurological:      General: No focal deficit present  Mental Status: She is alert

## 2023-02-13 NOTE — PROGRESS NOTES
Assessment/Plan: Patient has 2 areas of skin thickening in the back which is getting bigger  This is related to her incisions for liposuction  This is most likely hypertrophic scar but I am not definitely sure  As this is giving her problems I am scheduling her for excision under local anesthesia/sedation  She also has infraumbilical abdominal pain  This has been going on since her abdominoplasty  I could not palpate any hernia defect however sometimes it could be difficult to palpate because of fascial plication  I am sending her for a CT of the abdomen to rule out any hernias  Her skin lesion surgery has been planned for March 9  No problem-specific Assessment & Plan notes found for this encounter  Diagnoses and all orders for this visit:    Abdominal pain  -     CT abdomen pelvis with and without contrast; Future    H/O abdominal surgery  -     CT abdomen pelvis with and without contrast; Future    Skin lesions    Other orders  -     drospirenone-ethinyl estradiol (MANUEL) 3-0 02 MG per tablet; take 1 tablet by mouth daily START DAY 1 OF MENSTRUAL CYCLE OF FIRST SUNDAY AFTER ONSET OF MENSES  -     hydroxychloroquine (PLAQUENIL) 200 mg tablet; Take 200 mg by mouth 2 (two) times a day          Subjective:      Patient ID: Jenn Fernandez is a 28 y o  female  40-year-old female patient came to my office regarding 2 problems  First problem was the appearance of 2 skin lesions over her back about a month ago  She says that there is no drainage  The skin lesions have become slightly bigger  They are mildly erythematous  Occasional pain occurs in those lesions  The second problem she is having is that following her abdominoplasty 2 years ago she is now starting to have some periumbilical pain  She says that the pain occurs more when she is lifting or straining  No obvious visible swelling  No nausea or vomiting        The following portions of the patient's history were reviewed and updated as appropriate: allergies, current medications, past family history, past medical history, past social history, past surgical history and problem list     Review of Systems   Constitutional: Negative  HENT: Negative  Eyes: Negative  Respiratory: Negative  Cardiovascular: Negative  Gastrointestinal: Negative  Endocrine: Negative  Genitourinary: Negative  Musculoskeletal: Negative  Allergic/Immunologic: Negative  Neurological: Negative  Hematological: Negative  Psychiatric/Behavioral: Negative  Objective:      BP 98/64 (BP Location: Left arm, Patient Position: Sitting, Cuff Size: Standard)   Pulse 74   Temp 98 3 °F (36 8 °C) (Tympanic)   Resp 18   Ht 5' 1 5" (1 562 m)   Wt 75 8 kg (167 lb)   SpO2 97%   BMI 31 04 kg/m²          Physical Exam  Vitals reviewed  Constitutional:       Appearance: Normal appearance  HENT:      Head: Normocephalic  Mouth/Throat:      Mouth: Mucous membranes are moist    Eyes:      Pupils: Pupils are equal, round, and reactive to light  Cardiovascular:      Rate and Rhythm: Normal rate and regular rhythm  Pulmonary:      Effort: Pulmonary effort is normal       Breath sounds: Normal breath sounds  Abdominal:      General: Bowel sounds are normal       Palpations: Abdomen is soft  Tenderness: There is abdominal tenderness (Minimum tenderness infraumbilical )  Comments: No cough impulse  No palpable defects  Musculoskeletal:         General: Normal range of motion  Cervical back: Normal range of motion  Back:    Skin:     General: Skin is warm  Neurological:      General: No focal deficit present  Mental Status: She is alert

## 2023-02-21 DIAGNOSIS — Z98.890 H/O ABDOMINAL SURGERY: Primary | ICD-10-CM

## 2023-02-21 DIAGNOSIS — R10.33 PERIUMBILICAL ABDOMINAL PAIN: ICD-10-CM

## 2023-02-22 ENCOUNTER — HOSPITAL ENCOUNTER (OUTPATIENT)
Dept: ULTRASOUND IMAGING | Facility: HOSPITAL | Age: 33
Discharge: HOME | End: 2023-02-22
Attending: SURGERY

## 2023-02-22 DIAGNOSIS — Z98.890 H/O ABDOMINAL SURGERY: ICD-10-CM

## 2023-02-22 DIAGNOSIS — R10.33 PERIUMBILICAL ABDOMINAL PAIN: ICD-10-CM

## 2023-03-02 ENCOUNTER — ANESTHESIA EVENT (OUTPATIENT)
Dept: PERIOP | Facility: HOSPITAL | Age: 33
End: 2023-03-02

## 2023-03-02 RX ORDER — SEMAGLUTIDE 0.25 MG/.5ML
INJECTION, SOLUTION SUBCUTANEOUS
COMMUNITY
Start: 2023-02-03

## 2023-03-02 NOTE — PRE-PROCEDURE INSTRUCTIONS
Pre-Surgery Instructions:   Medication Instructions   • acetaminophen (TYLENOL) 500 mg tablet Hold day of surgery     • cetirizine (ZyrTEC) oral solution Hold day of surgery     • drospirenone-ethinyl estradiol (MANUEL) 3-0 02 MG per tablet Hold day of surgery     • hydroxychloroquine (PLAQUENIL) 200 mg tablet Hold from now till after procedure      • Wegovy 0 25 MG/0 5ML Takes on tuesday    My Surgical Experience    The following information was developed to assist you to prepare for your operation  What do I need to do before coming to the hospital?  • Arrange for a responsible person to drive you to and from the hospital   • Arrange care for your children at home  Children are not allowed in the recovery areas of the hospital  • Plan to wear clothing that is easy to put on and take off  If you are having shoulder surgery, wear a shirt that buttons or zippers in the front  Bathing  o Shower the evening before and the morning of your surgery with an antibacterial soap  Please refer to the Pre Op Showering Instructions for Surgery Patients Sheet   o Remove nail polish and all body piercing jewelry  o Do not shave any body part for at least 24 hours before surgery-this includes face, arms, legs and upper body  Food  o Nothing to eat or drink after midnight the night before your surgery  This includes candy and chewing gum  o Exception: If your surgery is after 12:00pm (noon), you may have clear liquids such as 7-Up®, ginger ale, apple or cranberry juice, Jell-O®, water, or clear broth until 8:00 am  o Do not drink milk or juice with pulp on the morning before surgery  o Do not drink alcohol 24 hours before surgery  Medicine  o Follow instructions you received from your surgeon about which medicines you may take on the day of surgery  o If instructed to take medicine on the morning of surgery, take pills with just a small sip of water   Call your prescribing doctor for specific infroamtion on what to do if you take insulin    What should I bring to the hospital?    Bring:  • Crutches or a walker, if you have them, for foot or knee surgery  • A list of the daily medicines, vitamins, minerals, herbals and nutritional supplements you take  Include the dosages of medicines and the time you take them each day  • Glasses, dentures or hearing aids  • Minimal clothing; you will be wearing hospital sleepwear  • Photo ID; required to verify your identity  • If you have a Living Will or Power of , bring a copy of the documents  • If you have an ostomy, bring an extra pouch and any supplies you use    Do not bring  • Medicines or inhalers  • Money, valuables or jewelry    What other information should I know about the day of surgery? • Notify your surgeons if you develop a cold, sore throat, cough, fever, rash or any other illness  • Report to the Ambulatory Surgical/Same Day Surgery Unit  • You will be instructed to stop at Registration only if you have not been pre-registered  • Inform your  fi they do not stay that they will be asked by the staff to leave a phone number where they can be reached  • Be available to be reached before surgery  In the event the operating room schedule changes, you may be asked to come in earlier or later than expected    *It is important to tell your doctor and others involved in your health care if you are taking or have been taking any non-prescription drugs, vitamins, minerals, herbals or other nutritional supplements   Any of these may interact with some food or medicines and cause a reaction

## 2023-03-03 DIAGNOSIS — Z98.890 H/O ABDOMINAL SURGERY: Primary | ICD-10-CM

## 2023-03-03 DIAGNOSIS — R10.33 PERIUMBILICAL ABDOMINAL PAIN: ICD-10-CM

## 2023-03-06 DIAGNOSIS — Z01.818 ENCOUNTER FOR PREADMISSION TESTING: Primary | ICD-10-CM

## 2023-03-07 ENCOUNTER — APPOINTMENT (OUTPATIENT)
Dept: LAB | Facility: MEDICAL CENTER | Age: 33
End: 2023-03-07

## 2023-03-07 DIAGNOSIS — Z01.818 OTHER SPECIFIED PRE-OPERATIVE EXAMINATION: ICD-10-CM

## 2023-03-08 ENCOUNTER — LAB REQUISITION (OUTPATIENT)
Dept: LAB | Facility: HOSPITAL | Age: 33
End: 2023-03-08

## 2023-03-08 ENCOUNTER — HOSPITAL ENCOUNTER (OUTPATIENT)
Dept: CT IMAGING | Facility: HOSPITAL | Age: 33
Discharge: HOME | End: 2023-03-08
Attending: SURGERY

## 2023-03-08 DIAGNOSIS — Z01.818 ENCOUNTER FOR OTHER PREPROCEDURAL EXAMINATION: ICD-10-CM

## 2023-03-08 DIAGNOSIS — Z98.890 H/O ABDOMINAL SURGERY: ICD-10-CM

## 2023-03-08 DIAGNOSIS — R10.33 PERIUMBILICAL ABDOMINAL PAIN: ICD-10-CM

## 2023-03-08 LAB
ABO GROUP BLD: NORMAL
BLD GP AB SCN SERPL QL: NEGATIVE
RH BLD: POSITIVE
SPECIMEN EXPIRATION DATE: NORMAL

## 2023-03-08 RX ADMIN — IOHEXOL 100 ML: 350 INJECTION, SOLUTION INTRAVENOUS at 18:06

## 2023-03-08 NOTE — PERIOPERATIVE NURSING NOTE
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 and sees a hematologist Dr Mel Rodríguez at [facility]  His last office note is from 12/2022  I would consider reaching out to Dr Arslan Fitzpatrick to see if any needs for patient for upcoming surgery  They provided a heme workup because INR was elevated  Thank you for your time, Dr Lanette Ramos  6 days ago    Rena Ashley  Could you please call the patient and ask her if she ever had issues with bleeding in her prior surgeries and what was the opinion of the hematologist at that time  She may need to call her hematologist prior to surgery so that we have some guidance  5 days ago    Gallo Nina MD  Thank you  We will contact patient's hematologist   5 days ago    Zander Black Fingerprint, Do you have any update about the patient needing to see her hematologist? Is clearance being requested? Thank you  2 days ago    Batsheva Finnegan RN  Hi - Dr Jaja Lee read over her note from  Hematology from 12/19  He doesn't feel clearance is needed  He did ask me to do a T&C and wants 1 FFP  2 days ago    Maricel Fernandez, 3 Linnea Scherer Md/Np  Please see msg from surgeon's office Thank you  Yesterday    Brittney Shaw, Rehan Tello, GINO  Message reviewed   Please confirm that surgeon's office is coordinating T&S and FFP Thanks  Yesterday    Kwesi Campos MD and Vahe Melo, just confirming that your office will be coordinating the T&S and FFP Thank you  23 hours ago    Bora Canela, GINO  Patient was instructed yesterday that she needed to go have the blood work done today  I put the order in yesterday  She said she was going today  21 hours ago    Zaid Lopez  I see her T&S in in process  What about the FFP? ? Thanks  3 hours ago    Anastasiya Colmenares  Good morning - I put the order in for the T&S because there was no order for a T&C  I put in the comment section of the order that she needed 1 unit of FFP  Should I be ordering it differently?  3 hours ago    Zaid Lopez  I am waiting to confirm process I believe Dr has to put in transfuse order if he wants it infused prior to procedure  If he just wants it available just in case just order for labwork is good  But will confirm and let u know for sure  2 hours ago    Anastasiya Colmenares  I spoke with the lab this morning and was advised that if Dr Griselda Dodge feels he needs it, then the OR is to call down to the lab 15 min or so before the test because it has to thaw   1 hour ago    Zaid Jessica  So I was correct  Check with Dr  If he wants FFP transfused in pre op he needs to place the order himself  He can do that now so that they know ahead of time  If he just wants available then lab work is good and he would have to place order if/when he decides he wants it to be infused  If you have any questions let me know  1 hour ago    Anastasiya Colmenares  I spoke with him and told him what the lab said and he said, ok, he knows what to do  Fingers crossed!

## 2023-03-08 NOTE — ANESTHESIA PREPROCEDURE EVALUATION
Procedure:  EXCISION SKIN LESION LOWER BACK X2  2CM EACH (Back)    Relevant Problems   ANESTHESIA (within normal limits)      CARDIO (within normal limits)      NEURO/PSYCH   (+) Anxiety      PULMONARY (within normal limits)        Physical Exam    Airway    Mallampati score: II  TM Distance: >3 FB  Neck ROM: full     Dental   No notable dental hx     Cardiovascular  Rhythm: regular, Rate: normal,     Pulmonary  Breath sounds clear to auscultation,     Other Findings        Anesthesia Plan  ASA Score- 2     Anesthesia Type- IV sedation with anesthesia with ASA Monitors  Additional Monitors:   Airway Plan:           Plan Factors-Exercise tolerance (METS): >4 METS  Chart reviewed  Existing labs reviewed  Patient summary reviewed  Patient is not a current smoker  Induction-     Postoperative Plan-     Informed Consent- Anesthetic plan and risks discussed with patient  I personally reviewed this patient with the CRNA  Discussed and agreed on the Anesthesia Plan with the CRNA  Zafar Cordova

## 2023-03-09 ENCOUNTER — HOSPITAL ENCOUNTER (OUTPATIENT)
Facility: HOSPITAL | Age: 33
Setting detail: OUTPATIENT SURGERY
Discharge: HOME | End: 2023-03-09
Attending: SURGERY | Admitting: SURGERY

## 2023-03-09 ENCOUNTER — ANESTHESIA (OUTPATIENT)
Dept: PERIOP | Facility: HOSPITAL | Age: 33
End: 2023-03-09

## 2023-03-09 VITALS
BODY MASS INDEX: 31.53 KG/M2 | OXYGEN SATURATION: 98 % | SYSTOLIC BLOOD PRESSURE: 116 MMHG | RESPIRATION RATE: 18 BRPM | TEMPERATURE: 98.1 F | DIASTOLIC BLOOD PRESSURE: 60 MMHG | HEART RATE: 92 BPM | HEIGHT: 61 IN | WEIGHT: 167 LBS

## 2023-03-09 DIAGNOSIS — L98.9 SKIN LESION OF BACK: ICD-10-CM

## 2023-03-09 PROBLEM — E66.9 CLASS 1 OBESITY IN ADULT: Status: ACTIVE | Noted: 2023-03-09

## 2023-03-09 LAB
EXT PREGNANCY TEST URINE: NEGATIVE
EXT. CONTROL: NORMAL

## 2023-03-09 RX ORDER — BUPIVACAINE HYDROCHLORIDE AND EPINEPHRINE 2.5; 5 MG/ML; UG/ML
INJECTION, SOLUTION EPIDURAL; INFILTRATION; INTRACAUDAL; PERINEURAL AS NEEDED
Status: DISCONTINUED | OUTPATIENT
Start: 2023-03-09 | End: 2023-03-09 | Stop reason: HOSPADM

## 2023-03-09 RX ORDER — FENTANYL CITRATE 50 UG/ML
INJECTION, SOLUTION INTRAMUSCULAR; INTRAVENOUS AS NEEDED
Status: DISCONTINUED | OUTPATIENT
Start: 2023-03-09 | End: 2023-03-09

## 2023-03-09 RX ORDER — FENTANYL CITRATE 50 UG/ML
50 INJECTION, SOLUTION INTRAMUSCULAR; INTRAVENOUS
Status: DISCONTINUED | OUTPATIENT
Start: 2023-03-09 | End: 2023-03-09 | Stop reason: HOSPADM

## 2023-03-09 RX ORDER — PROPOFOL 10 MG/ML
INJECTION, EMULSION INTRAVENOUS AS NEEDED
Status: DISCONTINUED | OUTPATIENT
Start: 2023-03-09 | End: 2023-03-09

## 2023-03-09 RX ORDER — SODIUM CHLORIDE, SODIUM LACTATE, POTASSIUM CHLORIDE, CALCIUM CHLORIDE 600; 310; 30; 20 MG/100ML; MG/100ML; MG/100ML; MG/100ML
125 INJECTION, SOLUTION INTRAVENOUS CONTINUOUS
Status: DISCONTINUED | OUTPATIENT
Start: 2023-03-09 | End: 2023-03-09 | Stop reason: HOSPADM

## 2023-03-09 RX ORDER — LIDOCAINE HYDROCHLORIDE 20 MG/ML
INJECTION, SOLUTION EPIDURAL; INFILTRATION; INTRACAUDAL; PERINEURAL AS NEEDED
Status: DISCONTINUED | OUTPATIENT
Start: 2023-03-09 | End: 2023-03-09

## 2023-03-09 RX ORDER — ROCURONIUM BROMIDE 10 MG/ML
INJECTION, SOLUTION INTRAVENOUS AS NEEDED
Status: DISCONTINUED | OUTPATIENT
Start: 2023-03-09 | End: 2023-03-09

## 2023-03-09 RX ORDER — DEXAMETHASONE SODIUM PHOSPHATE 10 MG/ML
INJECTION, SOLUTION INTRAMUSCULAR; INTRAVENOUS AS NEEDED
Status: DISCONTINUED | OUTPATIENT
Start: 2023-03-09 | End: 2023-03-09

## 2023-03-09 RX ORDER — MIDAZOLAM HYDROCHLORIDE 2 MG/2ML
INJECTION, SOLUTION INTRAMUSCULAR; INTRAVENOUS AS NEEDED
Status: DISCONTINUED | OUTPATIENT
Start: 2023-03-09 | End: 2023-03-09

## 2023-03-09 RX ORDER — SODIUM CHLORIDE, SODIUM LACTATE, POTASSIUM CHLORIDE, CALCIUM CHLORIDE 600; 310; 30; 20 MG/100ML; MG/100ML; MG/100ML; MG/100ML
INJECTION, SOLUTION INTRAVENOUS CONTINUOUS PRN
Status: DISCONTINUED | OUTPATIENT
Start: 2023-03-09 | End: 2023-03-09

## 2023-03-09 RX ORDER — OXYCODONE HYDROCHLORIDE AND ACETAMINOPHEN 5; 325 MG/1; MG/1
1 TABLET ORAL EVERY 6 HOURS PRN
Qty: 5 TABLET | Refills: 0 | Status: SHIPPED | OUTPATIENT
Start: 2023-03-09 | End: 2023-03-19

## 2023-03-09 RX ORDER — CLINDAMYCIN PHOSPHATE 600 MG/50ML
600 INJECTION INTRAVENOUS ONCE
Status: COMPLETED | OUTPATIENT
Start: 2023-03-09 | End: 2023-03-09

## 2023-03-09 RX ORDER — ONDANSETRON 2 MG/ML
INJECTION INTRAMUSCULAR; INTRAVENOUS AS NEEDED
Status: DISCONTINUED | OUTPATIENT
Start: 2023-03-09 | End: 2023-03-09

## 2023-03-09 RX ORDER — MAGNESIUM HYDROXIDE 1200 MG/15ML
LIQUID ORAL AS NEEDED
Status: DISCONTINUED | OUTPATIENT
Start: 2023-03-09 | End: 2023-03-09 | Stop reason: HOSPADM

## 2023-03-09 RX ORDER — ONDANSETRON 2 MG/ML
4 INJECTION INTRAMUSCULAR; INTRAVENOUS ONCE AS NEEDED
Status: DISCONTINUED | OUTPATIENT
Start: 2023-03-09 | End: 2023-03-09 | Stop reason: HOSPADM

## 2023-03-09 RX ADMIN — FENTANYL CITRATE 50 MCG: 50 INJECTION INTRAMUSCULAR; INTRAVENOUS at 13:45

## 2023-03-09 RX ADMIN — CLINDAMYCIN PHOSPHATE 600 MG: 600 INJECTION, SOLUTION INTRAVENOUS at 13:54

## 2023-03-09 RX ADMIN — FENTANYL CITRATE 50 MCG: 50 INJECTION INTRAMUSCULAR; INTRAVENOUS at 13:39

## 2023-03-09 RX ADMIN — SODIUM CHLORIDE, SODIUM LACTATE, POTASSIUM CHLORIDE, AND CALCIUM CHLORIDE: .6; .31; .03; .02 INJECTION, SOLUTION INTRAVENOUS at 12:31

## 2023-03-09 RX ADMIN — FENTANYL CITRATE 50 MCG: 50 INJECTION INTRAMUSCULAR; INTRAVENOUS at 14:00

## 2023-03-09 RX ADMIN — LIDOCAINE HYDROCHLORIDE 100 MG: 20 INJECTION, SOLUTION EPIDURAL; INFILTRATION; INTRACAUDAL; PERINEURAL at 13:45

## 2023-03-09 RX ADMIN — DEXAMETHASONE SODIUM PHOSPHATE 10 MG: 10 INJECTION, SOLUTION INTRAMUSCULAR; INTRAVENOUS at 13:50

## 2023-03-09 RX ADMIN — PROPOFOL 200 MG: 10 INJECTION, EMULSION INTRAVENOUS at 13:45

## 2023-03-09 RX ADMIN — MIDAZOLAM HYDROCHLORIDE 2 MG: 1 INJECTION, SOLUTION INTRAMUSCULAR; INTRAVENOUS at 13:39

## 2023-03-09 RX ADMIN — ROCURONIUM BROMIDE 30 MG: 10 SOLUTION INTRAVENOUS at 13:45

## 2023-03-09 RX ADMIN — ONDANSETRON 4 MG: 2 INJECTION INTRAMUSCULAR; INTRAVENOUS at 13:50

## 2023-03-09 RX ADMIN — FENTANYL CITRATE 50 MCG: 50 INJECTION, SOLUTION INTRAMUSCULAR; INTRAVENOUS at 15:13

## 2023-03-09 RX ADMIN — SUGAMMADEX 200 MG: 100 INJECTION, SOLUTION INTRAVENOUS at 14:29

## 2023-03-09 RX ADMIN — FENTANYL CITRATE 50 MCG: 50 INJECTION INTRAMUSCULAR; INTRAVENOUS at 14:45

## 2023-03-09 NOTE — ANESTHESIA POSTPROCEDURE EVALUATION
Post-Op Assessment Note    CV Status:  Stable  Pain Score: 0    Pain management: adequate     Mental Status:  Alert and awake   Hydration Status:  Euvolemic   PONV Controlled:  Controlled   Airway Patency:  Patent      Post Op Vitals Reviewed: Yes      Staff: Anesthesiologist, CRNA         No notable events documented      /58 (03/09/23 1450)    Temp 97 8 °F (36 6 °C) (03/09/23 1450)    Pulse (!) 109 (03/09/23 1450)   Resp 12 (03/09/23 1450)    SpO2 95 % (03/09/23 1450)

## 2023-03-09 NOTE — OP NOTE
OPERATIVE REPORT  PATIENT NAME: Baltazar Yanes    :  1990  MRN: [id number]  Pt Location: [facility] OR ROOM 02    SURGERY DATE: 3/9/2023    Surgeon(s) and Role: Kobe Diaz MD - Primary     * Allan Tompkins PA-C - Assisting    Preop Diagnosis:  Skin lesion of back [L98 9]    Post-Op Diagnosis Codes:     * Skin lesion of back [L98 9]    Procedure(s):  EXCISION SKIN LESION LOWER BACK X2  2CM EACH  Intermediate level of closure of 3 cm incisions x2  Specimen(s):  ID Type Source Tests Collected by Time Destination   1 : Skin lesion x 2, back Tissue Lesion TISSUE EXAM Melissa Vazquez MD 3/9/2023 1413        Estimated Blood Loss:   Minimal    Drains:  Closed/Suction Drain Right;Lateral;Inferior Abdomen Other (Comment) 15 Fr  (Active)   Number of days: 708       Closed/Suction Drain Left;Lateral;Inferior Abdomen Other (Comment) 15 Fr  (Active)   Number of days: 708       Anesthesia Type:   IV Sedation with Anesthesia    Operative Indications:  Skin lesion of back [L98 9]      Operative Findings: Thickened indurated skin lesion each 2 cm x 2 lower back    Complications:   None    Procedure and Technique:  The patient was brought to the operating room and identified correctly  General anesthesia was given by anesthesia team   Patient was placed in prone position  Parts were prepped and draped in standard fashion  Local anesthesia infiltration was done around the lesions  An elliptical portion of the skin including the lesion was excised from both the sides  This was done using the scalpel blade  Hemostasis was ensured using electrocautery  The incision was closed in 3 layers using 2-0 Vicryl, 3-0 Vicryl for subcutaneous and subdermal layers and 4-0 subcuticular skin layer in running fashion  Exofin was applied  Patient was reversed from anesthesia and taken to the recovery under stable condition     I was present for the entire procedure, A qualified resident physician was not available and A physician assistant was required during the procedure for retraction tissue handling,dissection and suturing    Patient Disposition:  PACU         SIGNATURE: Kami Kwon MD  DATE: March 9, 2023  TIME: 2:14 PM

## 2023-03-09 NOTE — DISCHARGE INSTRUCTIONS
Postoperative Care Instructions  Excision of Lesions    1  General: You may feel pulling sensations around the wound or funny aches and pains around the incisions  This is normal  Even minor surgery is a change in your body and this is your body's reaction to it  If you have had abdominal surgery, it may help to support the incision with a small pillow or blanket for comfort when moving or coughing  2  Wound care: The glue over the incisions will fall off over the next week or two  If you have staples or stitches, they will be removed by the physician at your follow up appointment  3  Showering: You may shower again 24 hours after surgery  Please do not soak wound in standing water such as a bath, hot tub, pool, lake, etc  Do not scrub or use exfoliants on the surgical wounds  4  Activity: You may go up and down stairs, walk as much as you are comfortable, but walk at least 3 times each day  If you have had abdominal surgery, do not perform any strenuous exercise or lift anything heavier than 15-20 pounds for at least 4 weeks, unless cleared by your physician  5  Diet: You may resume your regular diet  Please drink lots of water  6  Medications: Resume all of your previous medications, unless told otherwise by the doctor  A good option for pain control is to start with acetaminophen(Tylenol) 650mg and ibuprofen(Advil) 400-600mg and alternate taking them every 3 hours  If this is not sufficient then you make take the narcotic pain medicine as prescribed  You do not need to take the narcotic pain medication unless you are having significant pain and discomfort  Please take the narcotic medication with food  Insure that you do not take more than 4000 mg of Tylenol per day  7  Driving: You will need someone to drive you home on the day of surgery  Do not drive or make any important decisions while on narcotic pain medication   Generally, you may drive 48 hours after you've stopped taking all narcotic pain medications  8  Upset Stomach: You may take Maalox, Tums, or similar items for an upset stomach  If your narcotic pain medication causes an upset stomach, do not take it on an empty stomach  Try taking it with at least some crackers or toast      9  Constipation: Patients often experience constipation after surgery  We recommend starting an over-the-counter medication for this, such as Metamucil, Senokot, Colace, milk of magnesia, etc  You may stop taking these medications a couple days after your last dose of narcotic medication  If you experience significant nausea or vomiting after abdominal surgery, call the office before trying any of these medications  10  Call the office: If you are experiencing any of the following: fevers above 101 5°, significant nausea or vomiting, if the wound develops drainage and/or excessive redness around the wound, or if you have significant diarrhea or other worsening symptoms  11  Pain: A prescription for narcotic pain medication will be sent to your pharmacy upon discharge from the hospital  Please only take this medication if absolutely necessary  Please return extra narcotics to your local pharmacy

## 2023-03-09 NOTE — INTERVAL H&P NOTE
H&P reviewed  After examining the patient I find no changes in the patients condition since the H&P had been written      Vitals:    03/09/23 1244   BP: 124/54   Pulse: 86   Resp: 20   Temp: 97 9 °F (36 6 °C)   SpO2: 100%

## (undated) DEVICE — NEEDLE 25G X 1 1/2

## (undated) DEVICE — SUT CHROMIC 4-0 PS-2 18 IN 1637G

## (undated) DEVICE — SCD SEQUENTIAL COMPRESSION COMFORT SLEEVE MEDIUM KNEE LENGTH: Brand: KENDALL SCD

## (undated) DEVICE — BETHLEHEM UNIVERSAL MINOR GEN: Brand: CARDINAL HEALTH

## (undated) DEVICE — SUT ETHILON 3-0 PS-1 18 IN 1663G

## (undated) DEVICE — GLOVE PI ULTRA TOUCH SZ.7.0

## (undated) DEVICE — JP CHAN DRN SIL HUBLESS 15FR W/TRO: Brand: CARDINAL HEALTH

## (undated) DEVICE — CHLORAPREP HI-LITE 26ML ORANGE

## (undated) DEVICE — PREP PAD BNS: Brand: CONVERTORS

## (undated) DEVICE — Device

## (undated) DEVICE — PLUMEPEN PRO 10FT

## (undated) DEVICE — SUT VICRYL 2-0 SH 27 IN UNDYED J417H

## (undated) DEVICE — MEDI-VAC YANK SUCT HNDL W/TPRD BULBOUS TIP: Brand: CARDINAL HEALTH

## (undated) DEVICE — INTENDED FOR TISSUE SEPARATION, AND OTHER PROCEDURES THAT REQUIRE A SHARP SURGICAL BLADE TO PUNCTURE OR CUT.: Brand: BARD-PARKER SAFETY BLADES SIZE 15, STERILE

## (undated) DEVICE — DRAPE EQUIPMENT RF WAND

## (undated) DEVICE — GLOVE PI ULTRA TOUCH SZ.6.5

## (undated) DEVICE — PAD GROUNDING ADULT

## (undated) DEVICE — SPECIMEN CONTAINER STERILE PEEL PACK

## (undated) DEVICE — BETHLEHEM UNIVERSAL LAPAROTOMY: Brand: CARDINAL HEALTH

## (undated) DEVICE — SUT PROLENE 1 CT-1 30 IN 8425H

## (undated) DEVICE — TUBING LIPOSUCTION ASPIRATION 12FT STERILE

## (undated) DEVICE — SKIN MARKER DUAL TIP WITH RULER CAP, FLEXIBLE RULER AND LABELS: Brand: DEVON

## (undated) DEVICE — 3000CC GUARDIAN II: Brand: GUARDIAN

## (undated) DEVICE — GLOVE UNDER SRG SKINSENSE 7.5

## (undated) DEVICE — ASTOUND STANDARD SURGICAL GOWN, XL: Brand: CONVERTORS

## (undated) DEVICE — TRAY FOLEY 16FR URIMETER SILICONE SURESTEP

## (undated) DEVICE — INTENDED FOR TISSUE SEPARATION, AND OTHER PROCEDURES THAT REQUIRE A SHARP SURGICAL BLADE TO PUNCTURE OR CUT.: Brand: BARD-PARKER ® CARBON RIB-BACK BLADES

## (undated) DEVICE — DRAPE TOWEL: Brand: CONVERTORS

## (undated) DEVICE — UNDYED MONOFILAMENT (POLYDIOXANONE), ABSORBABLE SURGICAL SUTURE: Brand: PDS

## (undated) DEVICE — ADHESIVE SKIN HIGH VISCOSITY EXOFIN 1ML

## (undated) DEVICE — GLOVE INDICATOR PI UNDERGLOVE SZ 7 BLUE

## (undated) DEVICE — SUT VICRYL 3-0 SH 27 IN J416H

## (undated) DEVICE — SUT VICRYL 2-0 CT-1 36 IN J945H

## (undated) DEVICE — SUT PDS II 2-0 CT-1 27 IN Z259H

## (undated) DEVICE — NEPTUNE E-SEP SMOKE EVACUATION PENCIL, COATED, 70MM BLADE, PUSH BUTTON SWITCH: Brand: NEPTUNE E-SEP

## (undated) DEVICE — CHEST/BREAST DRAPE: Brand: CONVERTORS

## (undated) DEVICE — GLOVE INDICATOR PI UNDERGLOVE SZ 8 BLUE

## (undated) DEVICE — BINDER ABDOMINAL 46-62 IN

## (undated) DEVICE — SUT MONOCRYL 3-0 PS-2 27 IN Y427H

## (undated) DEVICE — SUT MONOCRYL 4-0 PS-2 18 IN Y496G

## (undated) DEVICE — GLOVE PI ULTRA TOUCH SZ.7.5

## (undated) DEVICE — JACKSON-PRATT 100CC BULB RESERVOIR: Brand: CARDINAL HEALTH

## (undated) DEVICE — ELECTRODE EZ CLEAN BLADE -0012

## (undated) DEVICE — SPONGE STICK WITH PVP-I: Brand: KENDALL